# Patient Record
Sex: FEMALE | URBAN - METROPOLITAN AREA
[De-identification: names, ages, dates, MRNs, and addresses within clinical notes are randomized per-mention and may not be internally consistent; named-entity substitution may affect disease eponyms.]

---

## 2024-02-29 ENCOUNTER — TELEPHONE (OUTPATIENT)
Dept: GASTROENTEROLOGY | Facility: CLINIC | Age: 79
End: 2024-02-29

## 2024-02-29 NOTE — TELEPHONE ENCOUNTER
Created a chart for patient. Spoke with Aleyda. He was advised that he will get the referral sent within an hour or so. He was given our fax number. He is requesting that patient gets seen by a NP as soon as possible. I explained to patient that it may not be until a week or so.     Sindy, I am sending this to you so you can be on the look out for the referral. Patient said he will wait about an hour before he calls back for an update. - dmp

## 2024-02-29 NOTE — TELEPHONE ENCOUNTER
Call Back  Received: Today   Pt Advice  [facility] Staff  Caller: Sae Maynard Son (Today,  8:49 AM)  Per phone call with Aleyda, he stated that Preethi Roa his step mother is having stomach issues and she has been in and out of the ER for the last four days, losing a lot of weight, no appetite and he is trying to get her to be seen as soon as possible.  Please return call at [phone].    The caller is not in the system an he does not have the patient date of birth but will have information when he received the phone call.    Thanks,  SJ